# Patient Record
Sex: MALE | Race: WHITE | NOT HISPANIC OR LATINO | URBAN - METROPOLITAN AREA
[De-identification: names, ages, dates, MRNs, and addresses within clinical notes are randomized per-mention and may not be internally consistent; named-entity substitution may affect disease eponyms.]

---

## 2017-01-04 ENCOUNTER — MYC MEDICAL ADVICE (OUTPATIENT)
Dept: AUDIOLOGY | Facility: CLINIC | Age: 62
End: 2017-01-04

## 2017-01-12 DIAGNOSIS — H81.09 ACTIVE MENIERE'S DISEASE, UNSPECIFIED LATERALITY: Primary | ICD-10-CM

## 2017-01-12 RX ORDER — DIAZEPAM 5 MG
TABLET ORAL
Qty: 30 TABLET | Refills: 1 | COMMUNITY
Start: 2017-01-12 | End: 2017-12-20

## 2017-02-03 DIAGNOSIS — H81.09 MENIERE'S DISEASE: Primary | ICD-10-CM

## 2017-02-03 RX ORDER — SPIRONOLACTONE 50 MG/1
50 TABLET, FILM COATED ORAL DAILY
Qty: 30 TABLET | Refills: 11 | Status: SHIPPED | OUTPATIENT
Start: 2017-02-03 | End: 2017-02-08

## 2017-02-08 DIAGNOSIS — H81.09 MENIERE'S DISEASE: Primary | ICD-10-CM

## 2017-02-08 RX ORDER — SPIRONOLACTONE 50 MG/1
50 TABLET, FILM COATED ORAL DAILY
Qty: 90 TABLET | Refills: 3 | Status: SHIPPED | OUTPATIENT
Start: 2017-02-08 | End: 2017-05-17

## 2017-02-23 ENCOUNTER — MYC MEDICAL ADVICE (OUTPATIENT)
Dept: AUDIOLOGY | Facility: CLINIC | Age: 62
End: 2017-02-23

## 2017-03-08 ENCOUNTER — MYC MEDICAL ADVICE (OUTPATIENT)
Dept: AUDIOLOGY | Facility: CLINIC | Age: 62
End: 2017-03-08

## 2017-03-22 ENCOUNTER — OFFICE VISIT (OUTPATIENT)
Dept: AUDIOLOGY | Facility: CLINIC | Age: 62
End: 2017-03-22

## 2017-03-22 DIAGNOSIS — H90.3 SENSORY HEARING LOSS, BILATERAL: Primary | ICD-10-CM

## 2017-03-22 NOTE — PROGRESS NOTES
"AUDIOLOGY REPORT    BACKGROUND: Dr. Demarcus Tavarez implanted J Carlos Swan with a left  Nucleus  cochlear implant (CI) serial #[id number] on 5/28/15 (activation 6/17/15) and a right Nucleus  cochlear implant serial #[id number] on 8/15/16 (activation 9/13/16) due to a left profound sensorineural hearing loss and a right severe sensorineural hearing loss.  The right ear fluctuates due to Meniere's Disease.      Patient is being seen in Audiology at the [facility] on 3/22/17 for 6 month testing with the right CI, a hearing evaluation, tympanograms and reprogramming of his bilateral cochlear implants.          PATIENT REPORT: Mr. Swan reports that he continues to adjust to the right CI.  Both CIs together sound better than either one alone.  The right sounds \"brighter\" than the left which has more bass.      He continues to follow up with Dr. Tavarez for medical management of his Meniere's.  Patient reports that he hasn't had any dizziness episodes for about 1 month.      The patient wears the right CI with volume at 3-4 and left at about 1.  He typically wears Cafe, though we reviewed today that he may hear better from directions other than directly in front if using Home or Scan.      Patient reports he is no longer noticing interference from equipment at work.      METHOD: Speech perception testing is conducted at regular intervals to determine the degree of benefit the patient is obtaining from the cochlear implant. Tests are conducted using the cochlear implant without the benefit of lipreading. All tests are conducted in a sound-treated room. Perception of monosyllabic words and words in sentences are tested. Results usually show improvement over time. A decrease in performance indicates the need for re-programming of the prosthesis and/or replacement of components.     INTERVAL: 6 months with right CI.  Has had left for 1 1/2 years+ (not tested today " since last assessment was in June 2016)    TEST RESULTS:    Unaided Thresholds:   Right stable severe rising to moderately severe sloping back to severe hearing loss.  Loss is known to be sensorineural in nature so bone conduction was not repeated today.    Left did not test today - no response (profound sensorineural hearing loss) at June 2015 assessment postoperatively.      Tympanograms: Normal bilaterally    35 minutes were spent assessing the patient s auditory rehabilitation status.    Device(s) used for Testing:   Right ear: N6 processor with #1 magnet (no irritation), Program 1 - Home, Volume 1  Left ear: N6 processor with #1 magnet (no irritation) - Did not test this device today  Microphone covers changed before programming and testing.     Soundfield Aided Thresholds:   Left CI - Did not test   Right CI - Detection in normal to mild loss range    CNC Words Test:  The patient repeats 25 single syllable words, auditory only. The words are presented at 60 dB A (conversational level) delivered from a CD player.    Preoperative Performance (Feb 2015 before 1st CI):  Left ear aided: 0%  Right ear aided: 76%  Bilaterally aided: 68%    Preoperative Performance (June 2016 before 2nd CI):  Right ear aided: 12%    Left CI:  3 months: 88%   6 months: 76%   9 months: 84%   1 years: 84%  Did not test today     Right CI:  3 months: 68%  6 months (today): 84%    AzBio Sentences Test:  The patient repeats 20 sentences, auditory only.  The sentences are presented at 60 dB A (conversational level) delivered from a CD player.     Preoperative Performance (Feb 2015 before 1st CI:  Left ear aided: 0%  Right ear aided: 97% quiet, 91% +10 dB signal to noise ratio (SNR)  Bilaterally aided: Did not test in quiet, 94% +10 dB SNR      Preoperative Performance (June 2016 before 2nd CI):  Right ear aided: 40%    Left CI:  3 months: 97% quiet, 76% with +10 dB signal to noise ratio (SNR), 47% with +5 dB SNR  6 months: 94% quiet, 86%  "with +10 dB SNR, 50% with +5 dB SNR  9 months: 96% quiet, 81% with +10 dB SNR, 64% with +5 dB SNR  1 years: 97% quiet, 76% with +10 dB SNR, 51% with +5 dB SNR  Did not test today     Right CI:  3 months: 95% in quiet, 53% with +10 dB SNR  6 months (today): 94% in quiet, 74% with +10 dB SNR    After testing, programming changes were made since the patient would like a mute program added on the right and would like the left adjusted to make the sound quality \"brighter\" and start up volume softer.      FITTING SESSION: Dr. Demarcus Tavarez, cochlear implant surgeon, ordered today's appointment. The patient came to the clinic for adjustment to the programs in the external speech processor and for assessment of the external components of the cochlear implant system. These components provide power and data to the internal device. Sound is only heard once the external portion is activated. Postoperative treatment, including device fitting and adjustment, audiologic assessments, and training are required at regular intervals. Testing can include electropsychophysical measures of threshold, comfort, and loudness balancing, which are completed to update the program.    Processor type: N6 bilaterally   Headpiece type: 900 Series  Magnet strength: #1 bilaterally - no irritation.     TEST RESULTS:   Unaided hearing: See above.  For the right ear, since the thresholds are around 70 dB or poorer (EAS fitting range limit) in the low frequencies and due to the hearing fluctuations due to the Meniere's, patient is no longer interested in trying the acoustic component.  He wishes to proceed with electrical stimulation only to give him more stable hearing.  We will hold the EAS kit in clinic in case patient wishes to try this in the future.    Tympanograms: See above  Electrode Impedances: within normal limits and stable bilaterally   Neural Response Testing: Clear responses were found across the array at previous visit so did not repeat " testing today.    Facial Stimulation: Absent  Tinnitus: Present  Balance Problems: Absent  Pain/Discomfort: Absent  Strategies Tried:  Hz    Programs in N6 processors bilaterally:  1. (Left 46, Right 41) Home: ADRO + Autosensitivity  2. (Left 32, Right 47) Stimulation levels at 0 so patient can mute processor to practice with right side alone (at patient request)  3. (Left 46, Right 41) SCAN  4. (Left 46, Right 41) Cafe: Fixed directional, ADRO + Autosensitivity    Number of Channels per Program: 22    COMMENTS: On the left side, the start up volume was decreased 3 units.  Gains were sloped up in the highs.  Patient was pleased with these changes.  On the right side, a mute program was created.  Backup processors were updated bilaterally.      SUMMARY AND RECOMMENDATIONS: Mr. Swan has been using his right Cochlear [employer]  cochlear implant for 6 months.  Speech perception scores are above average, improved from 3 month testing and are similar to past left ear testing.  Patient will return in 6 months for testing in the right, left and bilateral conditions.    Programming changes were made to reduce left volume, increase high frequency gains on the left and to add a mute program on the right.      Patient has no residual hearing in the left ear and a stable severe rising to moderately severe sloping back to severe sensorineural hearing loss in the right ear.  Tympanograms are normal bilaterally.  Patient will continue to follow up with Dr. Tavarez regarding his Meniere's and for any other concerns related to his ears or cochlear implants.  Dr. Tavarez was updated on today's findings.        The patient expressed understanding and agreement with this plan.      Esetban Fernandez, CCC-A  Licensed Audiologist  [place] #[id number]    Enclosure: audiogram      Cc Demarcus Tavarez MD

## 2017-03-22 NOTE — MR AVS SNAPSHOT
After Visit Summary   3/22/2017    J Carlos Swan    MRN: [id number]           Patient Information     Date Of Birth          1955        Visit Information        Provider Department      3/22/2017 8:00 AM Jennifer Bingham AuD [facility] Audiology        Today's Diagnoses     Sensory hearing loss, bilateral    -  1       Follow-ups after your visit        Follow-up notes from your care team     Return in about 6 months (around 9/22/2017) for .      Your next 10 appointments already scheduled     Sep 18, 2017  8:00 AM CDT   Cochlear Implant Return with Naresh Britton [facility] Audiology ([facility] [place])    [address]  [address] [place] [address] [phone]              Who to contact     Please call your clinic at [phone] to:    Ask questions about your health    Make or cancel appointments    Discuss your medicines    Learn about your test results    Speak to your doctor   If you have compliments or concerns about an experience at your clinic, or if you wish to file a complaint, please contact [facility] Patient Relations at [phone] or email us at [email or web address]         Additional Information About Your Visit        MyChart Information     [email or web address] gives you secure access to your electronic health record. If you see a primary care provider, you can also send messages to your care team and make appointments. If you have questions, please call your primary care clinic.  If you do not have a primary care provider, please call [phone] and they will assist you.      "[employer]" is an electronic gateway that provides easy, online access to your medical records. With "[employer]", you can request a clinic appointment, read your test results, renew a prescription or communicate with your care team.     To access your existing account, please contact your [facility] or call  [phone] for assistance.        Care EveryWhere ID     This is your Care EveryWhere ID. This could be used by other organizations to access your [place] medical records  [id number]         Blood Pressure from Last 3 Encounters:   08/15/16 124/86   05/28/15 130/79    Weight from Last 3 Encounters:   08/15/16 72.6 kg (160 lb)   05/28/15 74.7 kg (164 lb 10.9 oz)              Today, you had the following     No orders found for display       Primary Care Provider Office Phone # Fax #    Guera SEAN Galvez [phone]       [facility]  [address] [address]        Thank you!     Thank you for choosing [facility] AUDIOLOGY  for your care. Our goal is always to provide you with excellent care. Hearing back from our patients is one way we can continue to improve our services. Please take a few minutes to complete the written survey that you may receive in the mail after your visit with us. Thank you!             Your Updated Medication List - Protect others around you: Learn how to safely use, store and throw away your medicines at www.disposemymeds.org.          This list is accurate as of: 3/22/17  8:46 AM.  Always use your most recent med list.                   Brand Name Dispense Instructions for use    ASPIRIN EC PO      Take 81 mg by mouth       B COMPLEX VITAMINS IJ          calcium carb 1250 mg (500 mg [place])/vitamin D 200 units 500-200 MG-UNIT per tablet    OSCAL with D     Take 1 tablet by mouth 2 times daily (with meals)       CENTRUM SILVER ULTRA MENS Tabs          glucosamine-chondroitin 500-400 MG Caps per capsule      Take 1 capsule by mouth daily       L-Proline 500 MG Caps          LEVOTHROID PO      Take 25 mcg by mouth       LIPITOR PO      Take 25 mg by mouth       LYSINE PO      Take 1 tablet by mouth daily       OMEGA-3 FISH OIL PO      Take 1 g by mouth daily       PROBIOTIC DAILY Caps          spironolactone 50 MG tablet    ALDACTONE    90 tablet     Take 1 tablet (50 mg) by mouth daily       * VALIUM PO      Take 5 mg by mouth every 12 hours as needed for anxiety       * diazepam 5 MG tablet    VALIUM    30 tablet    Take one tablet every 4-6 hours for symptoms of dizziness       Vitamin D-3 1000 UNITS Caps          Zinc 30 MG Caps          * Notice:  This list has 2 medication(s) that are the same as other medications prescribed for you. Read the directions carefully, and ask your doctor or other care provider to review them with you.

## 2017-03-27 ENCOUNTER — MYC MEDICAL ADVICE (OUTPATIENT)
Dept: AUDIOLOGY | Facility: CLINIC | Age: 62
End: 2017-03-27

## 2017-05-17 DIAGNOSIS — H81.09 MENIERE'S DISEASE: ICD-10-CM

## 2017-05-17 RX ORDER — SPIRONOLACTONE 50 MG/1
50 TABLET, FILM COATED ORAL DAILY
Qty: 90 TABLET | Refills: 3 | Status: SHIPPED | OUTPATIENT
Start: 2017-05-17

## 2017-07-31 ENCOUNTER — MYC MEDICAL ADVICE (OUTPATIENT)
Dept: AUDIOLOGY | Facility: CLINIC | Age: 62
End: 2017-07-31

## 2017-08-01 NOTE — TELEPHONE ENCOUNTER
From: J Carlos Swan  To: Jennifer Bingham AuD  Sent: 7/31/2017 6:02 PM CDT  Subject: Question about an upcoming visit    Hello Dr. Bingham,    I see that the Nucleus 7 was recently announced. I see that it finally has iPhone integration, which is terrific. Do you know anything about potential improvements in processing technology? The press release doesn't say anything about this.    The web site says that upgrades are available in October for current recipients that have internal implants Nucleus CI24RE,  and Profile Series Implants.    I checked online and determined that I have the  in my left ear and the  in my right ear. Will these internal implants be supported eventually for upgrades as well?     If so, when would it make sense to consider replacing my N6 processors with N7s? I would really like to have the direct to iPhone capability. Unless you're wearing your phone clip, which is bulky and has a clip that tends to break, phoning is very difficult.    Thanks,    J Carlos Swan

## 2017-09-13 DIAGNOSIS — H90.3 SENSORINEURAL HEARING LOSS, BILATERAL: Primary | ICD-10-CM

## 2017-09-18 ENCOUNTER — OFFICE VISIT (OUTPATIENT)
Dept: AUDIOLOGY | Facility: CLINIC | Age: 62
End: 2017-09-18

## 2017-09-18 DIAGNOSIS — H90.3 SENSORINEURAL HEARING LOSS, BILATERAL: ICD-10-CM

## 2017-09-18 NOTE — PROGRESS NOTES
"AUDIOLOGY REPORT    BACKGROUND: Dr. Demarcus Tavarez implanted J Carlos Swan with a left  Nucleus  cochlear implant (CI) serial #[id number] on 5/28/15 (activation 6/17/15) and a right Nucleus  cochlear implant serial #[id number] on 8/15/16 (activation 9/13/16) due to a left profound sensorineural hearing loss and a right severe sensorineural hearing loss.  The right ear fluctuates due to Meniere's Disease.      Patient is being seen in Audiology at the [facility] on 9/18/17 for 1 year testing with the right CI, 2 year testing with the left CI, a hearing evaluation, and reprogramming of his left cochlear implant.  He has declined doing tympanograms today due to cost.          PATIENT REPORT: Mr. Swan reports he turns the left device down to volume of 1 and leaves the right one at about the middle setting (between 5-7).  His left CI gives a \"broader range of pitches\" while the left seems lower in pitch to him.      He continues to follow up with Dr. Tavarez for medical management of his Meniere's.      NOTE: Patient reports he only has 1 hour to 1 hour 1 minutes available to complete today's appointment, which was scheduled for 2 hours.  Therefore, speech perception testing was abbreviated today.      METHOD: Speech perception testing is conducted at regular intervals to determine the degree of benefit the patient is obtaining from the cochlear implant. Tests are conducted using the cochlear implant without the benefit of lipreading. All tests are conducted in a sound-treated room. Perception of monosyllabic words and words in sentences are tested. Results usually show improvement over time. A decrease in performance indicates the need for re-programming of the prosthesis and/or replacement of components.     INTERVAL: 1 year with right CI; 2 years with left CI.      TEST RESULTS:    Unaided Thresholds:   Right essentially stable severe rising to moderately " severe sloping back to severe hearing loss (10 dB improvement at 1000 Hz and 10 dB decrease at 6000 Hz).  Loss is known to be sensorineural in nature so bone conduction was not repeated today.    Left did not test today - no response (profound sensorineural hearing loss) at June 2015 assessment postoperatively.      Tympanograms: Patient declined testing due to cost.  He denies otologic symptoms.    35 minutes were spent assessing the patient s auditory rehabilitation status.    Device(s) used for Testing:   Right ear: N6 processor with #1 magnet (no irritation), Program 1 - Home, Volume 7  Left ear: N6 processor with #1 magnet (no irritation) - Program 1 - Home, Volume 1  Microphone covers changed before programming and testing.     Soundfield Aided Thresholds:   Left CI - Detection in normal to mild loss range  Right CI - Detection in normal to mild loss range  Stable bilaterally    CNC Words Test:  The patient repeats 25 single syllable words, auditory only. The words are presented at 60 dB A (conversational level) delivered from a CD player.    Preoperative Performance (Feb 2015 before 1st CI):  Left ear aided: 0%  Right ear aided: 76%  Bilaterally aided: 68%    Preoperative Performance (June 2016 before 2nd CI):  Right ear aided: 12%    Left CI:  3 months: 88%   6 months: 76%   9 months: 84%   1 years: 84%  2 years (today): 88%    Right CI:  3 months: 68%  6 months: 84%  1 year (today): 68%     Bilateral CIs:  1 year (today): Did not test (DNT) due to high scores in unilateral conditions and patient's time limitations.    AzBio Sentences Test:  The patient repeats 20 sentences, auditory only.  The sentences are presented at 60 dB A (conversational level) delivered from a CD player.     Preoperative Performance (Feb 2015 before 1st CI:  Left ear aided: 0%  Right ear aided: 97% quiet, 91% +10 dB signal to noise ratio (SNR)  Bilaterally aided: Did not test in quiet, 94% +10 dB SNR      Preoperative Performance  (June 2016 before 2nd CI):  Right ear aided: 40%    Left CI:  3 months: 97% quiet, 76% with +10 dB signal to noise ratio (SNR), 47% with +5 dB SNR  6 months: 94% quiet, 86% with +10 dB SNR, 50% with +5 dB SNR  9 months: 96% quiet, 81% with +10 dB SNR, 64% with +5 dB SNR  1 years: 97% quiet, 76% with +10 dB SNR, 51% with +5 dB SNR  2 years (today): 94% quiet, 61% with +10 dB SNR, DNT +5 dB SNR due to time limitations    Right CI:  3 months: 95% in quiet, 53% with +10 dB SNR  6 months: 94% in quiet, 74% with +10 dB SNR  1 year (today): 98% quiet, 67% with +10 dB SNR, DNT +5 dB SNR due to time limitations    Bilateral CIs:  1 year (today): DNT due to time limitations    After testing, programming changes were made since the patient has been reducing the left volume and feels the quality is too low pitched.       FITTING SESSION: Dr. Demarcus Tavarez, cochlear implant surgeon, ordered today's appointment. The patient came to the clinic for adjustment to the programs in the external speech processor and for assessment of the external components of the cochlear implant system. These components provide power and data to the internal device. Sound is only heard once the external portion is activated. Postoperative treatment, including device fitting and adjustment, audiologic assessments, and training are required at regular intervals. Testing can include electropsychophysical measures of threshold, comfort, and loudness balancing, which are completed to update the program.    Processor type: N6 bilaterally   Headpiece type: 900 Series  Magnet strength: #1 bilaterally - no irritation.     TEST RESULTS:   Unaided hearing: See above.  For the right ear, since the thresholds are around 70 dB or poorer (EAS fitting range limit) in the low frequencies and due to the hearing fluctuations due to the Meniere's, patient is no longer interested in trying the acoustic component.  He wishes to proceed with electrical stimulation only to  give him more stable hearing.  We will hold the EAS kit in clinic in case patient wishes to try this in the future.    Tympanograms: See above - patient declined testing  Electrode Impedances: within normal limits and fairly stable for left ear.  Did not test right ear today.    Neural Response Testing: Clear responses were found across the arrays at previous visits so did not repeat testing today.    Facial Stimulation: Absent  Tinnitus: Present  Balance Problems: Absent  Pain/Discomfort: Absent  Strategies Tried:  Hz    Programs in N6 processors bilaterally:  1. (Left 48, Right 41) Home: ADRO + Autosensitivity  2. (Left 32, Right 47) Stimulation levels at 0 so patient can mute processor to practice with right side alone (at patient request)  3. (Left 48, Right 41) SCAN  4. (Left 48, Right 41) Cafe: Fixed directional, ADRO + Autosensitivity    Number of Channels per Program: 22    COMMENTS: The right ear was not changed today.  On the left side, threshold levels decreased in the low frequencies.  Comfort (C) levels increased across the array.  Upon going live, C levels were globally decreased 5 units to achieve comfort.  Gains were sloped up in the highs.  Patient was pleased with these changes.  Backup processor was updated bilaterally.      SUMMARY AND RECOMMENDATIONS: Mr. Swan has been using his right Cochlear [employer]  cochlear implant for 1 year and his left Cochlear [employer]  for 2 years.  Speech perception scores are above average and stable except for decrease in left CI noise scores.  Programming of the left CI was completed today due to this and patient's reports on volume and sound quality.  Patient will return for repeat testing in 1 year.  He will return sooner as needed for programming.      Patient has no residual hearing in the left ear and a stable severe rising to moderately severe sloping back to severe sensorineural hearing loss in the right ear.  Tympanograms were not  completed today at patient request due to cost.  Patient will continue to follow up with Dr. Tavarez regarding his Meniere's and for any other concerns related to his ears or cochlear implants.  The patient expressed understanding and agreement with this plan.      Esteban Fernandez, CCC-A  Licensed Audiologist  [place] #[id number]    Enclosure: audiogram

## 2017-09-18 NOTE — MR AVS SNAPSHOT
After Visit Summary   9/18/2017    J Carlos Swan    MRN: [id number]           Patient Information     Date Of Birth          1955        Visit Information        Provider Department      9/18/2017 8:00 AM Jennifer Bingham AuD M [facility] Audiology        Today's Diagnoses     Sensorineural hearing loss, bilateral           Follow-ups after your visit        Follow-up notes from your care team     Return in about 1 year (around 9/18/2018) for .      Who to contact     Please call your clinic at [phone] to:    Ask questions about your health    Make or cancel appointments    Discuss your medicines    Learn about your test results    Speak to your doctor   If you have compliments or concerns about an experience at your clinic, or if you wish to file a complaint, please contact [facility] Patient Relations at [phone] or email us at [email or web address]         Additional Information About Your Visit        MyChart Information     [employer] gives you secure access to your electronic health record. If you see a primary care provider, you can also send messages to your care team and make appointments. If you have questions, please call your primary care clinic.  If you do not have a primary care provider, please call [phone] and they will assist you.      [email or web address] is an electronic gateway that provides easy, online access to your medical records. With [email or web address], you can request a clinic appointment, read your test results, renew a prescription or communicate with your care team.     To access your existing account, please contact your [facility] or call [phone] for assistance.        Care EveryWhere ID     This is your Care EveryWhere ID. This could be used by other organizations to access your [place] medical records  [id number]         Blood Pressure from Last 3 Encounters:   08/15/16 124/86   05/28/15 130/79     Weight from Last 3 Encounters:   08/15/16 72.6 kg (160 lb)   05/28/15 74.7 kg (164 lb 10.9 oz)              We Performed the Following     AUDIOGRAM/TYMPANOGRAM - INTERFACE     AUDIOLOGY ADULT REFERRAL     Audiometry/Air   (99910)     Eval of Aud Rehab Status (60 min)   (65664)     LT: Diagnostic Analysis of CI 7 yrs & over, Subsequent Programming   (14885)        Primary Care Provider Office Phone # Fax #    Guera Galvez [phone]       [facility]  [address] [address]        Equal Access to Services     [facility] AH: Hadii aad ku hadasho Sotiffanyali, waaxda luqadaha, qaybta kaalmada adelesleyyada, radha greenberg. So [facility] [phone].    ATENCIÓN: Si habla español, tiene a watkins disposición servicios gratuitos de asistencia lingüística. [facility] [phone].    We comply with applicable federal civil rights laws and [place] laws. We do not discriminate on the basis of race, color, national origin, age, disability sex, sexual orientation or gender identity.            Thank you!     Thank you for choosing [facility] AUDIOLOGY  for your care. Our goal is always to provide you with excellent care. Hearing back from our patients is one way we can continue to improve our services. Please take a few minutes to complete the written survey that you may receive in the mail after your visit with us. Thank you!             Your Updated Medication List - Protect others around you: Learn how to safely use, store and throw away your medicines at www.disposemymeds.org.          This list is accurate as of: 9/18/17 11:47 AM.  Always use your most recent med list.                   Brand Name Dispense Instructions for use Diagnosis    ASPIRIN EC PO      Take 81 mg by mouth        B COMPLEX VITAMINS IJ           calcium carb 1250 mg (500 mg Qagan Tayagungin)/vitamin D 200 units 500-200 MG-UNIT per tablet    OSCAL with D     Take 1 tablet by mouth 2 times daily (with  meals)        CENTRUM SILVER ULTRA MENS Tabs           glucosamine-chondroitin 500-400 MG Caps per capsule      Take 1 capsule by mouth daily        L-Proline 500 MG Caps           LEVOTHROID PO      Take 25 mcg by mouth        LIPITOR PO      Take 25 mg by mouth        LYSINE PO      Take 1 tablet by mouth daily        OMEGA-3 FISH OIL PO      Take 1 g by mouth daily        PROBIOTIC DAILY Caps           spironolactone 50 MG tablet    ALDACTONE    90 tablet    Take 1 tablet (50 mg) by mouth daily    Meniere's disease       * VALIUM PO      Take 5 mg by mouth every 12 hours as needed for anxiety        * diazepam 5 MG tablet    VALIUM    30 tablet    Take one tablet every 4-6 hours for symptoms of dizziness    Active Meniere's disease, unspecified laterality       Vitamin D-3 1000 UNITS Caps           Zinc 30 MG Caps           * Notice:  This list has 2 medication(s) that are the same as other medications prescribed for you. Read the directions carefully, and ask your doctor or other care provider to review them with you.

## 2017-12-20 DIAGNOSIS — H81.09 ACTIVE MENIERE'S DISEASE, UNSPECIFIED LATERALITY: ICD-10-CM

## 2017-12-20 RX ORDER — DIAZEPAM 5 MG
TABLET ORAL
Qty: 30 TABLET | Refills: 5
Start: 2017-12-20

## 2018-01-11 ENCOUNTER — MYC MEDICAL ADVICE (OUTPATIENT)
Dept: AUDIOLOGY | Facility: CLINIC | Age: 63
End: 2018-01-11

## 2018-01-12 ENCOUNTER — MYC MEDICAL ADVICE (OUTPATIENT)
Dept: AUDIOLOGY | Facility: CLINIC | Age: 63
End: 2018-01-12

## 2018-04-30 DIAGNOSIS — H81.09 MENIERE'S DISEASE: Primary | ICD-10-CM

## 2018-04-30 RX ORDER — SPIRONOLACTONE 50 MG/1
50 TABLET, FILM COATED ORAL DAILY
Qty: 90 TABLET | Refills: 3 | Status: SHIPPED | OUTPATIENT
Start: 2018-04-30

## 2018-08-28 DIAGNOSIS — Z96.21 COCHLEAR IMPLANT STATUS: Primary | ICD-10-CM

## 2018-09-19 ENCOUNTER — OFFICE VISIT (OUTPATIENT)
Dept: AUDIOLOGY | Facility: CLINIC | Age: 63
End: 2018-09-19
Payer: COMMERCIAL

## 2018-09-19 DIAGNOSIS — H90.3 SENSORY HEARING LOSS, BILATERAL: Primary | ICD-10-CM

## 2018-09-19 DIAGNOSIS — Z96.21 COCHLEAR IMPLANT STATUS: ICD-10-CM

## 2018-09-19 NOTE — PROGRESS NOTES
AUDIOLOGY REPORT    BACKGROUND: Dr. Demarcus Tavarez implanted J Carlos Swan with a left  Nucleus  cochlear implant (CI) serial #[id number] on 5/28/15 (activation 6/17/15) and a right Nucleus  cochlear implant serial #[id number] on 8/15/16 (activation 9/13/16) due to a left profound sensorineural hearing loss and a right severe sensorineural hearing loss.  The right ear fluctuates due to Meniere's Disease.      Patient is being seen in Audiology at the [facility] [place] on 9/19/18 for 2 year testing with the right CI, 3 year testing with the left CI, a hearing evaluation, and reprogramming of his bilateral cochlear implants.  He has declined doing tympanograms today due to cost.          PATIENT REPORT: Mr. Swan reports that he turns both cochlear implants down to a volume of 1.  He needs a missing earhook replaced on one of his processors.  This was accomplished today.      He continues to follow up with Dr. Tavarez for medical management of his Meniere's.  Patient reports he has given up caffeine and alcohol and has had no recent dizziness episodes.     METHOD: Speech perception testing is conducted at regular intervals to determine the degree of benefit the patient is obtaining from the cochlear implant. Tests are conducted using the cochlear implant without the benefit of lipreading. All tests are conducted in a sound-treated room. Perception of monosyllabic words and words in sentences are tested. Results usually show improvement over time. A decrease in performance indicates the need for re-programming of the prosthesis and/or replacement of components.     INTERVAL: 2 years with right CI; 3 years with left CI.      TEST RESULTS:    Unaided Thresholds:   Right severe rising to moderately severe sloping back to severe sensorineural hearing loss hearing loss (re: 9/18/17 decrease of 10-15 dB from 250-1000 Hz and 3000 Hz; improvement of 10-15 dB 0286-0802  Hz).      Left did not test today - no response (profound sensorineural hearing loss) at June 2015 assessment postoperatively.      Tympanograms: Patient declined testing due to cost.  He denies otologic symptoms.    45 minutes were spent assessing the patient s auditory rehabilitation status.    Device(s) used for Testing:   Right ear: N6 processor with #2 magnet - Reduced to #1 from backup coil today and another will be ordered and mailed to his home (slight irritation of skin with #2 and retention felt stronger than necessary; patient is unsure how it is a #2 as he was wearing a #1 at last year's visit), Program 3 SCAN, Volume 1  Left ear: N6 processor with #1 magnet (no irritation) - Program 3 SCAN, Volume 1  Microphone covers changed before programming and testing.     Soundfield Aided Thresholds:   Left CI - Detection in normal to mild loss range  Right CI - Detection in borderline normal to mild loss range  Stable bilaterally    CNC Words Test:  The patient repeats 25 single syllable words, auditory only. The words are presented at 60 dB A (conversational level) delivered from a CD player.    Preoperative Performance (Feb 2015 before 1st CI):  Left ear aided: 0%  Right ear aided: 76%  Bilaterally aided: 68%    Preoperative Performance (June 2016 before 2nd CI):  Right ear aided: 12%    Left CI:  3 months: 88%   6 months: 76%   9 months: 84%   1 years: 84%  2 years: 88%  3 years (today): 84%    Right CI:  3 months: 68%  6 months: 84%  1 year: 68%   2 years (today): 76%     Bilateral CIs:  1 year: Did not test (DNT) due to high scores in unilateral conditions and patient's time limitations.  2 years (today): DNT due to high scores in unilateral conditions    [employer] Sentences Test:  The patient repeats 20 sentences, auditory only.  The sentences are presented at 60 dB A (conversational level) delivered from a CD player.     Preoperative Performance (Feb 2015 before 1st CI:  Left ear aided: 0%  Right ear aided:  97% quiet, 91% +10 dB signal to noise ratio (SNR)  Bilaterally aided: Did not test in quiet, 94% +10 dB SNR      Preoperative Performance (June 2016 before 2nd CI):  Right ear aided: 40%    Left CI:  3 months: 97% quiet, 76% with +10 dB signal to noise ratio (SNR)  6 months: 94% quiet, 86% with +10 dB SNR  9 months: 96% quiet, 81% with +10 dB SNR  1 years: 97% quiet, 76% with +10 dB SNR  2 years: 94% quiet, 61% with +10 dB SNR  3 years (today): 91% quiet, 61% with +10 dB SNR     Right CI:  3 months: 95% in quiet, 53% with +10 dB SNR  6 months: 94% in quiet, 74% with +10 dB SNR  1 year: 98% quiet, 67% with +10 dB SNR  2 years (today): 96% quiet, 77% with +10 dB SNR    Bilateral CIs:  1 year: DNT due to time limitations  2 years (today): 75% with +10 dB SNR, 38% with +5 dB SNR    After testing, programming changes were made since the patient has been reducing the volume to 1 bilaterally.      FITTING SESSION: Dr. Demarcus Tavarez, cochlear implant surgeon, ordered today's appointment. The patient came to the clinic for adjustment to the programs in the external speech processor and for assessment of the external components of the cochlear implant system. These components provide power and data to the internal device. Sound is only heard once the external portion is activated. Postoperative treatment, including device fitting and adjustment, audiologic assessments, and training are required at regular intervals. Testing can include electropsychophysical measures of threshold, comfort, and loudness balancing, which are completed to update the program.    Processor type: N6 bilaterally   Headpiece type: 900 Series  Magnet strength: #1 bilaterally     TEST RESULTS:   Unaided hearing: See above.    Tympanograms: See above - patient declined testing  Electrode Impedances: within normal limits and slight lower for left ear.  Within normal limits and stable for right ear.     Neural Response Testing: Clear responses were found  across the arrays at previous visits so did not repeat testing today.    Facial Stimulation: Absent  Tinnitus: Present  Balance Problems: Absent  Pain/Discomfort: Absent  Strategies Tried:  Hz    Programs in N6 processors bilaterally:  1. (Left 52, Right 51) Home: ADRO + Autosensitivity  2. (Left 32, Right 47) Stimulation levels at 0 so patient can mute processor to practice with right side alone (at patient request)  3. (Left 52, Right 51) SCAN  4. (Left 52, Right 51) Cafe: Fixed directional, ADRO + Autosensitivity    Number of Channels per Program: 22    COMMENTS: Since the patient has been decreasing the volume to 1 bilaterally, comfort (C) levels were globally reduced 5 units.  Speech was still slightly too loud so threshold and comfort levels were reduced an additional 2 units.  Volume range was changed from +/-20% to +/-30% to allow more patient adjustments.      SUMMARY AND RECOMMENDATIONS: Mr. Swan has been using his right [employer]  cochlear implant for 2 years and his left [employer]  for 3 years.  Speech perception scores are above average and stable.  Programming of both CIs was completed today to reduce start up volume and widen the volume range.  Patient will return for repeat testing in 1 year.  He will return sooner as needed for programming.      Patient has no residual hearing in the left ear.  Hearing in the right ear has decreased in the low frequencies after implantation, as expected.  Tympanograms were not completed today at patient request due to cost.  Patient will continue to follow up with Dr. Tavarez regarding his Meniere's and for any other concerns related to his ears or cochlear implants.  The patient expressed understanding and agreement with this plan.      Esteban Fernandez, CCC-A  Licensed Audiologist  [place] #[id number]    Enclosure: audiogram    Cc Demarcus Tavarez MD

## 2018-09-19 NOTE — MR AVS SNAPSHOT
After Visit Summary   9/19/2018    J Carlos Swan    MRN: [id number]           Patient Information     Date Of Birth          1955        Visit Information        Provider Department      9/19/2018 1:00 PM Jennifer Bingham AuD [facility] Audiology        Today's Diagnoses     Sensory hearing loss, bilateral    -  1    Cochlear implant status           Follow-ups after your visit        Who to contact     Please call your clinic at [phone] to:    Ask questions about your health    Make or cancel appointments    Discuss your medicines    Learn about your test results    Speak to your doctor            Additional Information About Your Visit        MyChart Information     [employer] gives you secure access to your electronic health record. If you see a primary care provider, you can also send messages to your care team and make appointments. If you have questions, please call your primary care clinic.  If you do not have a primary care provider, please call [phone] and they will assist you.      [employer] is an electronic gateway that provides easy, online access to your medical records. With [employer], you can request a clinic appointment, read your test results, renew a prescription or communicate with your care team.     To access your existing account, please contact your [facility] or call [phone] for assistance.        Care EveryWhere ID     This is your Care EveryWhere ID. This could be used by other organizations to access your [place] medical records  [id number]         Blood Pressure from Last 3 Encounters:   08/15/16 124/86   05/28/15 130/79    Weight from Last 3 Encounters:   08/15/16 72.6 kg (160 lb)   05/28/15 74.7 kg (164 lb 10.9 oz)              We Performed the Following     AUDIOGRAM/TYMPANOGRAM - INTERFACE     AUDIOLOGY ADULT REFERRAL     Audiometry/Air and Bone   (21113)     Eval of Aud Rehab Status (60 min)   (23455)     LT: Diagnostic Analysis  of CI 7 yrs & over, Subsequent Programming   (37610)     RT: Diagnostic Analysis of CI 7 yrs & over, Subsequent Programming   (46061)        Primary Care Provider Office Phone # Fax #    Guera Galvez [phone]       [facility]  [address] [address]        Equal Access to Services     ADDI SOLIS AH: Hadii aad ku hadasho Soomaali, waaxda luqadaha, qaybta kaalmada adeegyada, waxay idiin hayaan adeeg kharash la'aan ah. So [facility] [phone].    ATENCIÓN: Si habla espnany, tiene a watkins disposición servicios gratuitos de asistencia lingüística. Eliel al [phone].    We comply with applicable federal civil rights laws and [place] laws. We do not discriminate on the basis of race, color, national origin, age, disability, sex, sexual orientation, or gender identity.            Thank you!     Thank you for choosing [facility] AUDIOLOGY  for your care. Our goal is always to provide you with excellent care. Hearing back from our patients is one way we can continue to improve our services. Please take a few minutes to complete the written survey that you may receive in the mail after your visit with us. Thank you!             Your Updated Medication List - Protect others around you: Learn how to safely use, store and throw away your medicines at www.disposemymeds.org.          This list is accurate as of 9/19/18  2:56 PM.  Always use your most recent med list.                   Brand Name Dispense Instructions for use Diagnosis    ASPIRIN EC PO      Take 81 mg by mouth        B COMPLEX VITAMINS IJ           calcium carbonate 500 mg-vitamin D 200 units 500-200 MG-UNIT per tablet    OSCAL with D;OYSTER SHELL CALCIUM     Take 1 tablet by mouth 2 times daily (with meals)        CENTRUM SILVER ULTRA MENS Tabs           glucosamine-chondroitin 500-400 MG Caps per capsule      Take 1 capsule by mouth daily        L-Proline 500 MG Caps           LEVOTHROID PO      Take 25 mcg by  mouth        LIPITOR PO      Take 25 mg by mouth        LYSINE PO      Take 1 tablet by mouth daily        OMEGA-3 FISH OIL PO      Take 1 g by mouth daily        PROBIOTIC DAILY Caps           * spironolactone 50 MG tablet    ALDACTONE    90 tablet    Take 1 tablet (50 mg) by mouth daily    Meniere's disease       * spironolactone 50 MG tablet    ALDACTONE    90 tablet    Take 1 tablet (50 mg) by mouth daily    Meniere's disease       * VALIUM PO      Take 5 mg by mouth every 12 hours as needed for anxiety        * diazepam 5 MG tablet    VALIUM    30 tablet    Take one tablet every 4-6 hours for symptoms of dizziness    Active Meniere's disease, unspecified laterality       Vitamin D-3 1000 units Caps           Zinc 30 MG Caps           * Notice:  This list has 4 medication(s) that are the same as other medications prescribed for you. Read the directions carefully, and ask your doctor or other care provider to review them with you.

## 2018-09-24 ENCOUNTER — TELEPHONE (OUTPATIENT)
Dept: AUDIOLOGY | Facility: CLINIC | Age: 63
End: 2018-09-24

## 2018-09-24 NOTE — TELEPHONE ENCOUNTER
Anaya #1 magnet received from [employer].  Clinic will ship this to patient's home address today.      Esteban Fernandez, CCC-A  Licensed Audiologist  [place] #[id number]

## 2019-09-16 DIAGNOSIS — H90.3 SENSORINEURAL HEARING LOSS, BILATERAL: Primary | ICD-10-CM

## 2019-09-21 NOTE — PROGRESS NOTES
AUDIOLOGY REPORT    BACKGROUND: Dr. Demarcus Tavarez implanted J Carlos Swan with a left  Nucleus  cochlear implant (CI) serial #[id number] on 5/28/15 (activation 6/17/15) and a right Nucleus  cochlear implant serial #[id number] on 8/15/16 (activation 9/13/16) due to a left profound sensorineural hearing loss and a right severe sensorineural hearing loss.  The right ear fluctuates due to Meniere's Disease.      Patient is being seen in Audiology at the [facility] [place] on 9/23/19 for 3 year testing with the right CI, 4 year testing with the left CI, a hearing evaluation, and reprogramming of his bilateral cochlear implants.  He has declined doing tympanograms today due to cost.     Dr. Agustina Sevilla ordered today's visit.          PATIENT REPORT: Mr. Swan reports no current concerns with his hearing or cochlear implant equipment.  He reports he has primarily been wearing Cafe program with decreased volume since his last visit.  He discussed that since Cafe is directional, that it is recommended he use Home or SCAN most of the time.      He continues to follow up in ENT Clinic for medical management of his Meniere's.  Patient reports he has given up caffeine and alcohol and has had no recent dizziness episodes.  He feels using the sauna daily has also been helpful.  He has recently retired and is spending his time playing tennis swimming, auditing a math class and attending book club.      METHOD: Speech perception testing is conducted at regular intervals to determine the degree of benefit the patient is obtaining from the cochlear implant. Tests are conducted using the cochlear implant without the benefit of lipreading. All tests are conducted in a sound-treated room. Perception of monosyllabic words and words in sentences are tested. Results usually show improvement over time. A decrease in performance indicates the need for re-programming of the prosthesis  and/or replacement of components.     INTERVAL: 3 years with right CI; 4 years with left CI.      TEST RESULTS:    Otoscopy: Cerumen removed from right ear using lighted curette without incident prior to testing.  Left ear had small amount of non-occluding cerumen which was not removed.      Unaided Thresholds:   Right profound rising to moderately severe primarily sensorineural hearing loss hearing loss (re: 9/19/18 decreased 10-15 dB from 250-2000 Hz).      Left did not test today - no response (profound sensorineural hearing loss) at June 2015 assessment postoperatively.      Tympanograms: Patient declined testing due to cost.  He denies otologic symptoms.    45 minutes were spent assessing the patient s auditory rehabilitation status.    Device(s) used for Testing:   Right ear: N6 processor with #1 magnet (no irritation), Program 3 SCAN, Volume 3  Left ear: N6 processor with #1 magnet (no irritation) - Program 3 SCAN, Volume 3  Microphone covers changed before programming and testing.     Soundfield Aided Thresholds:   Left CI - Detection in normal to mild loss range  Right CI - Detection in normal to mild loss range  Stable bilaterally    CNC Words Test:  The patient repeats 25 single syllable words, auditory only. The words are presented at 60 dB A (conversational level) delivered from a CD player.    Preoperative Performance (Feb 2015 before 1st CI):  Left ear aided: 0%  Right ear aided: 76%  Bilaterally aided: 68%    Preoperative Performance (June 2016 before 2nd CI):  Right ear aided: 12%    Left CI:  3 months: 88%   6 months: 76%   9 months: 84%   1 years: 84%  2 years: 88%  3 years: 84%  4 years (today): 56%      Right CI:  3 months: 68%  6 months: 84%  1 year: 68%   2 years: 76%   3 years (today): 56%    Bilateral CIs:  1 year: Did not test (DNT) due to high scores in unilateral conditions and patient's time limitations.  2 years: DNT due to high scores in unilateral conditions  3 years (today): DNT due  to need for programming    AzBio Sentences Test:  The patient repeats 20 sentences, auditory only.  The sentences are presented at 60 dB A (conversational level) delivered from a CD player.     Preoperative Performance (Feb 2015 before 1st CI:  Left ear aided: 0%  Right ear aided: 97% quiet, 91% +10 dB signal to noise ratio (SNR)  Bilaterally aided: Did not test in quiet, 94% +10 dB SNR      Preoperative Performance (June 2016 before 2nd CI):  Right ear aided: 40%    Left CI:  3 months: 97% quiet, 76% with +10 dB signal to noise ratio (SNR)  6 months: 94% quiet, 86% with +10 dB SNR  9 months: 96% quiet, 81% with +10 dB SNR  1 years: 97% quiet, 76% with +10 dB SNR  2 years: 94% quiet, 61% with +10 dB SNR  3 years: 91% quiet, 61% with +10 dB SNR   4 years (today): 90% quiet, Did not test in noise due to need for programming     Right CI:  3 months: 95% in quiet, 53% with +10 dB SNR  6 months: 94% in quiet, 74% with +10 dB SNR  1 year: 98% quiet, 67% with +10 dB SNR  2 years: 96% quiet, 77% with +10 dB SNR  3 years (today): 94% quiet, Did not test in noise due to need for programming    Bilateral CIs:  1 year: DNT due to time limitations  2 years: 75% with +10 dB SNR, 38% with +5 dB SNR  3 years (today): 93% quiet, 72% with +10 dB SNR, Did not test +5 dB SNR due to need for programming     After testing, programming changes were made since CNC word scores decreased bilaterally.       FITTING SESSION: Dr. Agustina Sevilla ordered today's appointment. The patient came to the clinic for adjustment to the programs in the external speech processor and for assessment of the external components of the cochlear implant system. These components provide power and data to the internal device. Sound is only heard once the external portion is activated. Postoperative treatment, including device fitting and adjustment, audiologic assessments, and training are required at regular intervals. Testing can include electropsychophysical  measures of threshold, comfort, and loudness balancing, which are completed to update the program.    Processor type: N6 bilaterally   Headpiece type: 900 Series  Magnet strength: #1 bilaterally - no irritation or discomfort     TEST RESULTS:   Unaided hearing: See above.    Tympanograms: See above - patient declined testing  Electrode Impedances: within normal limits and stable bilaterally  Neural Response Testing: Clear responses were found across the arrays at previous visits so did not repeat testing today.    Facial Stimulation: Absent  Tinnitus: Present  Balance Problems: Absent  Pain/Discomfort: Absent  Strategies Tried:  Hz    Programs in N6 processors bilaterally:  1. (Left 56, Right 55) Home: ADRO + Autosensitivity  2. (Left 32, Right 47) Stimulation levels at 0 so patient can mute processor to practice with right side alone (at patient request)  3. (Left 56, Right 55) SCAN  4. (Left 56, Right 55) Cafe: Fixed directional, ADRO + Autosensitivity    Number of Channels per Program: 22    COMMENTS: Threshold (T) levels were fairly stable for the left CI and decreased in the high frequencies for the right CI.  In general, comfort (C) levels increased slightly bilaterally.  Upon going live, speech was too loud and T and C levels were globally reduced 5 units bilaterally to achieve comfort.  The backup processors were updated.  Patient uses the backup processors with [employer] when playing tennis.        SUMMARY AND RECOMMENDATIONS: Mr. Swan has been using his right [employer]  cochlear implant for 3 years and his left [employer]  for 4 years.  Speech perception scores are above average and stable for [employer] sentences.  CNC word scores have decreased.  It is unclear whether this was because he has started wearing Cafe program exclusively or a programming issue.  Patient was advised to try to wear Home or SCAN most of the time and to only switch to Cafe in noise.  Programming  changes were made.  Patient will return for repeat testing in 1 year.  He will return sooner as needed for programming.      Patient has no residual hearing in the left ear.  Hearing in the right ear has decreased in the low frequencies after implantation, as expected.  Tympanograms were not completed today at patient request due to cost.  Patient will continue to follow up in ENT Clinic regarding his Meniere's and for any other concerns related to his ears or cochlear implants.  The patient expressed understanding and agreement with this plan.      Esteban Fernandez, CCC-A  Licensed Audiologist  [place] #[id number]      Enclosure: audiogram

## 2019-09-23 ENCOUNTER — OFFICE VISIT (OUTPATIENT)
Dept: AUDIOLOGY | Facility: CLINIC | Age: 64
End: 2019-09-23
Attending: OTOLARYNGOLOGY
Payer: COMMERCIAL

## 2019-09-23 DIAGNOSIS — H90.3 SENSORY HEARING LOSS, BILATERAL: Primary | ICD-10-CM

## 2019-11-05 ENCOUNTER — HEALTH MAINTENANCE LETTER (OUTPATIENT)
Age: 64
End: 2019-11-05

## 2020-02-16 ENCOUNTER — HEALTH MAINTENANCE LETTER (OUTPATIENT)
Age: 65
End: 2020-02-16

## 2020-09-10 NOTE — PROGRESS NOTES
AUDIOLOGY REPORT    BACKGROUND: Dr. Demarcus Tavarez implanted J Carlos Swan with a left  Nucleus  cochlear implant (CI) serial #[id number] on 5/28/15 (activation 6/17/15) and a right Nucleus  cochlear implant serial #[id number] on 8/15/16 (activation 9/13/16) due to a left profound sensorineural hearing loss and a right severe sensorineural hearing loss.  The right ear fluctuates due to Meniere's Disease.      Patient is being seen in Audiology at the [facility] on 9/21/2020 for 4 year testing with the right CI, 5 year testing with the left CI, a hearing evaluation, and reprogramming of his bilateral cochlear implants.  He has declined doing tympanograms today due to cost.     Dr. Gail Ramirez ordered today's visit.          PATIENT REPORT: Mr. Swan reports no current concerns with his hearing or cochlear implant equipment.  He would like to wait to upgrade equipment until both ears are eligible and after a new processor later becomes available.      He reports he has primarily been wearing Cafe program with decreased volume since his last visit.  He discussed that since Cafe is directional, that it is recommended he use Home or SCAN most of the time.      One processor will not allow a battery to twist on.  The lock was engaged by mistake.  When this was unlocked, the battery went on normally.  Reviewed with patient.     He continues to follow up in ENT Clinic for medical management of his Meniere's.  Patient reports he has given up caffeine and alcohol and has had no recent dizziness episodes.  He feels using the sauna daily has also been helpful.  He retired last year and is spending his time playing tennis swimming and attending book club.      METHOD: Speech perception testing is conducted at regular intervals to determine the degree of benefit the patient is obtaining from the cochlear implant. Tests are conducted using the cochlear implant without  the benefit of lipreading. All tests are conducted in a sound-treated room. Perception of monosyllabic words and words in sentences are tested. Results usually show improvement over time. A decrease in performance indicates the need for re-programming of the prosthesis and/or replacement of components.     INTERVAL: 4 years with right CI; 5 years with left CI.      TEST RESULTS:    Otoscopy: Left ear had small amount of non-occluding cerumen.  Right ear clear of cerumen.      Unaided Thresholds:   Right profound rising to moderately severe loss - stable.  Known sensorineural so did not retest bone.        Left did not test today - no response (profound sensorineural hearing loss) at June 2015 assessment postoperatively.      Tympanograms: Patient declined testing due to cost.  He denies otologic symptoms.    45 minutes were spent assessing the patient s auditory rehabilitation status.    Device(s) used for Testing:   Right ear: N6 processor with #1 magnet (no irritation), Program 3 SCAN, Volume 3  Left ear: N6 processor with #1 magnet (no irritation) - Program 3 SCAN, Volume 3  Microphone covers changed before programming and testing.     Soundfield Aided Thresholds:   Left CI - Detection in borderline normal to mild loss range - stable  Right CI - Detection in normal to borderline normal loss range - stable      CNC Words Test:  The patient repeats 25 single syllable words, auditory only. The words are presented at 60 dB A (conversational level) delivered from a CD player.    Preoperative Performance (Feb 2015 before 1st CI):  Left ear aided: 0%  Right ear aided: 76%  Bilaterally aided: 68%    Preoperative Performance (June 2016 before 2nd CI):  Right ear aided: 12%    Left CI:  3 months: 88%   6 months: 76%   9 months: 84%   1 years: 84%  2 years: 88%  3 years: 84%  4 years: 56%  5 years (today): 72% - improved but slightly below baseline    Right CI:  3 months: 68%  6 months: 84%  1 year: 68%   2 years: 76%   3  years: 56%  4 years (today): 80% - improved    Bilateral CIs:  1 year: Did not test (DNT) due to high scores in unilateral conditions and patient's time limitations.  2 years: DNT due to high scores in unilateral conditions  3 years: DNT due to need for programming   4 years (today): 84%    Az[employer] Sentences Test:  The patient repeats 20 sentences, auditory only.  The sentences are presented at 60 dB A (conversational level) delivered from a CD player.     Preoperative Performance (Feb 2015 before 1st CI:  Left ear aided: 0%  Right ear aided: 97% quiet, 91% +10 dB signal to noise ratio (SNR)  Bilaterally aided: Did not test in quiet, 94% +10 dB SNR      Preoperative Performance (June 2016 before 2nd CI):  Right ear aided: 40%    Left CI:  3 months: 97% quiet, 76% with +10 dB signal to noise ratio (SNR)  6 months: 94% quiet, 86% with +10 dB SNR  9 months: 96% quiet, 81% with +10 dB SNR  1 years: 97% quiet, 76% with +10 dB SNR  2 years: 94% quiet, 61% with +10 dB SNR  3 years: 91% quiet, 61% with +10 dB SNR   4 years: 90% quiet, Did not test in noise due to need for programming   5 years (today): 84% quiet - slightly decreased, Did not test in noise due to need for programming     Right CI:  3 months: 95% in quiet, 53% with +10 dB SNR  6 months: 94% in quiet, 74% with +10 dB SNR  1 year: 98% quiet, 67% with +10 dB SNR  2 years: 96% quiet, 77% with +10 dB SNR  3 years: 94% quiet, Did not test in noise due to need for programming  4 years (today): 95% quiet - stable, Did not test in noise due to need for programming    Bilateral CIs:  1 year: DNT due to time limitations  2 years: 75% with +10 dB SNR, 38% with +5 dB SNR  3 years: 93% quiet, 72% with +10 dB SNR, Did not test +5 dB SNR due to need for programming   4 years (today): DNT quiet, 75% with +10 dB SNR - stable, Did not test +5 dB SNR due to need for programming     After testing, programming changes based on left CI score changes and patient report that he  decreases volume bilaterally.       FITTING SESSION: Dr. Gail Ramirez ordered today's appointment. The patient came to the clinic for adjustment to the programs in the external speech processor and for assessment of the external components of the cochlear implant system. These components provide power and data to the internal device. Sound is only heard once the external portion is activated. Postoperative treatment, including device fitting and adjustment, audiologic assessments, and training are required at regular intervals. Testing can include electropsychophysical measures of threshold, comfort, and loudness balancing, which are completed to update the program.    Processor type: N6 bilaterally   Headpiece type: 900 Series  Magnet strength: #1 bilaterally - no irritation or discomfort     TEST RESULTS:   Unaided hearing: See above.    Tympanograms: See above - patient declined testing  Datalogging: Right 12.3 hours of use per day, Left 5.3 hours of use per day - this is not accurate as patient switched from a backup since the battery lock was engaged on the primary  Electrode Impedances: within normal limits and stable bilaterally  Neural Response Testing: Clear responses were found across the arrays at previous visits so did not repeat testing today.    Facial Stimulation: Absent  Tinnitus: Present  Balance Problems: Absent  Pain/Discomfort: Absent  Strategies Tried:  Hz    Programs in N6 processors bilaterally:  1. (Left 61, Right 62) Home: ADRO + Autosensitivity  2. (Left 32, Right 47) Stimulation levels at 0 so patient can mute processor to practice with right side alone (at patient request)  3. (Left 61, Right 62) SCAN  4. (Left 61, Right 62) Cafe: Fixed directional, ADRO + Autosensitivity    Number of Channels per Program: 22    COMMENTS: For the left CI, threshold (T) levels were measured.  They increased slightly and were globally increased 5 units due to the wide dynamic range.  C levels were  fairly stable to slightly increased.  For the right CI, since scores were stable, only global C level adjustments were made. Upon going live in the bilateral condition, C levels were globally reduced to comfort in the left CI and T and C levels (due to narrower dynamic range) were globally reduced to comfort in right CI.  Volume control range was increased.  Patient reported improved comfort after the changes.  Backup processors were updated.     SUMMARY AND RECOMMENDATIONS: Mr. Swan has been using his right [employer]  cochlear implant for 4 years and his left [employer]  for 5 years.  Speech perception scores are above average.  CNC scores improved bilaterally compared to last year's testing when they declined but are not quite back to baseline for left CI.  Right [employer] was stable but left decreased slightly.  Based on this programming changes were made to the left CI and global volume decreases were also made in right CI at patient request. Patient was advised to try to wear Home or SCAN most of the time and to only switch to Cafe in noise.  Patient will return for repeat testing in 1 year.  He will return sooner as needed for programming.  He wishes to wait to upgrade equipment until both ears are eligible and when there is new technology available.     Patient has no residual hearing in the left ear.  Hearing in the right ear is stable.  Tympanograms were not completed today at patient request due to cost.  Patient will continue to follow up in ENT Clinic regarding his Meniere's and for any other concerns related to his ears or cochlear implants.  The patient expressed understanding and agreement with this plan.    Esteban Fernandez, CCC-A, [facility]  Licensed Audiologist  [place] #[id number]    Enclosure: audiogram

## 2020-09-16 DIAGNOSIS — H90.3 SENSORINEURAL HEARING LOSS, BILATERAL: Primary | ICD-10-CM

## 2020-09-21 ENCOUNTER — OFFICE VISIT (OUTPATIENT)
Dept: AUDIOLOGY | Facility: CLINIC | Age: 65
End: 2020-09-21
Payer: COMMERCIAL

## 2020-09-21 DIAGNOSIS — H90.3 SENSORINEURAL HEARING LOSS, BILATERAL: ICD-10-CM

## 2020-11-22 ENCOUNTER — HEALTH MAINTENANCE LETTER (OUTPATIENT)
Age: 65
End: 2020-11-22

## 2021-04-10 ENCOUNTER — HEALTH MAINTENANCE LETTER (OUTPATIENT)
Age: 66
End: 2021-04-10

## 2021-08-24 DIAGNOSIS — H90.3 SENSORINEURAL HEARING LOSS, BILATERAL: Primary | ICD-10-CM

## 2021-09-19 ENCOUNTER — HEALTH MAINTENANCE LETTER (OUTPATIENT)
Age: 66
End: 2021-09-19

## 2021-12-12 NOTE — PROGRESS NOTES
AUDIOLOGY REPORT    BACKGROUND: Dr. Demarcus Tavarez implanted J Carlos Swan with a left  Nucleus  cochlear implant (CI) serial #[id number] on 5/28/15 (activation 6/17/15) and a right Nucleus  cochlear implant serial #[id number] on 8/15/16 (activation 9/13/16) due to a left profound sensorineural hearing loss and a right severe sensorineural hearing loss.  The right ear fluctuates due to Meniere's Disease.      Patient is being seen in Audiology at the [facility] on 12/22/2021 for 5 year testing with the right CI, 6 year testing with the left CI, and reprogramming of his bilateral cochlear implants.      Dr. Gail Ramirez ordered today's visit.        PATIENT REPORT: Mr. Swan reports that his wife feels he hasn't been hearing as well over the last 6 months.  He generally reduces the volume and wears program 1.  He reports one of his backup processors was not working but when troubleshooting today in clinic, both were functioning.  He would like to wait to upgrade equipment until the next generation processor is available if possible.      Patient has not has any recent issues with dizziness.  About 6-8 months ago, he had a transient memory issue and work up in ER did not find a cause.     METHOD: Speech perception testing is conducted at regular intervals to determine the degree of benefit the patient is obtaining from the cochlear implant. Tests are conducted using the cochlear implant without the benefit of lipreading. All tests are conducted in a sound-treated room. Perception of monosyllabic words and words in sentences are tested. Results usually show improvement over time. A decrease in performance indicates the need for re-programming of the prosthesis and/or replacement of components.     INTERVAL: 5 years with right CI; 6 years with left CI.      TEST RESULTS:    Otoscopy: Both ears are clear of cerumen.      Unaided Thresholds: Did not test - Left  no response in past; Right did not test due to CI and cost   Tympanograms: Patient declined testing due to cost.  He denies otologic symptoms.    45 minutes were spent assessing the patient s auditory rehabilitation status.    Device(s) used for Testing:   Right ear: N6 processor with #1 magnet (no irritation), Program 1  Left ear: N6 processor with #1 magnet (no irritation) - Program 1  Microphone covers changed before programming and testing.     Soundfield Aided Thresholds:   Left CI - Detection in normal to mild loss range - stable  Right CI - Detection in normal to mild loss range - stable      CNC Words Test:  The patient repeats 25 single syllable words, auditory only. The words are presented at 60 dB A (conversational level) delivered from a CD player.    Preoperative Performance (Feb 2015 before 1st CI):  Left ear aided: 0%  Right ear aided: 76%  Bilaterally aided: 68%    Preoperative Performance (June 2016 before 2nd CI):  Right ear aided: 12%    Left CI:  3 months: 88%   6 months: 76%   9 months: 84%   1 years: 84%  2 years: 88%  3 years: 84%  4 years: 56%  5 years: 72% - improved but slightly below baseline  6 years (today): 76% - stable    Right CI:  3 months: 68%  6 months: 84%  1 year: 68%   2 years: 76%   3 years: 56%  4 years: 80% - improved  5 years (today): 76% - stable    Bilateral CIs:  1 year: Did not test (DNT) due to high scores in unilateral conditions and patient's time limitations.  2 years: DNT due to high scores in unilateral conditions  3 years: DNT due to need for programming   4 years: 84%  5 years (today): 80% - stable     AzBio Sentences Test:  The patient repeats 20 sentences, auditory only.  The sentences are presented at 60 dB A (conversational level) delivered from a CD player.     Preoperative Performance (Feb 2015 before 1st CI:  Left ear aided: 0%  Right ear aided: 97% quiet, 91% +10 dB signal to noise ratio (SNR)  Bilaterally aided: Did not test in quiet, 94% +10 dB  SNR      Preoperative Performance (June 2016 before 2nd CI):  Right ear aided: 40%    Left CI:  3 months: 97% quiet, 76% with +10 dB signal to noise ratio (SNR)  6 months: 94% quiet, 86% with +10 dB SNR  9 months: 96% quiet, 81% with +10 dB SNR  1 years: 97% quiet, 76% with +10 dB SNR  2 years: 94% quiet, 61% with +10 dB SNR  3 years: 91% quiet, 61% with +10 dB SNR   4 years: 90% quiet, Did not test in noise due to need for programming   5 years: 84% quiet - slightly decreased, Did not test in noise due to need for programming   6 years (today): 89% quiet - stable     Right CI:  3 months: 95% in quiet, 53% with +10 dB SNR  6 months: 94% in quiet, 74% with +10 dB SNR  1 year: 98% quiet, 67% with +10 dB SNR  2 years: 96% quiet, 77% with +10 dB SNR  3 years: 94% quiet, Did not test in noise due to need for programming  4 years: 95% quiet - stable, Did not test in noise due to need for programming  5 years (today): 91% quiet - stable    Bilateral CIs:  1 year: DNT due to time limitations  2 years: 75% with +10 dB SNR, 38% with +5 dB SNR  3 years: 93% quiet, 72% with +10 dB SNR, Did not test +5 dB SNR due to need for programming   4 years: DNT quiet, 75% with +10 dB SNR - stable, Did not test +5 dB SNR due to need for programming   5 years (today): DNT quiet, 68% with +10 dB SNR - stable, Did not test +5 dB SNR due to need for programming    After testing, programming changes were made based on patient report that he has been decreasing volume and his wife feels his understanding has decreased.      FITTING SESSION: Dr. Gail Ramirez ordered today's appointment. The patient came to the clinic for adjustment to the programs in the external speech processor and for assessment of the external components of the cochlear implant system. These components provide power and data to the internal device. Sound is only heard once the external portion is activated. Postoperative treatment, including device fitting and adjustment,  audiologic assessments, and training are required at regular intervals. Testing can include electropsychophysical measures of threshold, comfort, and loudness balancing, which are completed to update the program.    Processor type: N6 bilaterally   Headpiece type: 900 Series  Magnet strength: #1 bilaterally - no irritation or discomfort     TEST RESULTS:   Unaided hearing: Did not test  Tympanograms: See above - patient declined testing  Electrode Impedances: within normal limits and stable bilaterally  Neural Response Testing: Clear responses were found across the arrays at previous visits so did not repeat testing today.    Facial Stimulation: Absent  Tinnitus: Present  Balance Problems: Absent  Pain/Discomfort: Absent  Strategies Tried:  Hz    Programs in N6 processors bilaterally:  1. (Left 66, Right 65) Home: ADRO + Autosensitivity  2. (Left 32, Right 47) Stimulation levels at 0 so patient can mute processor to practice with right side alone (at patient request)  3. (Left 66, Right 65) SCAN  4. (Left 66, Right 65) Cafe: Fixed directional, ADRO + Autosensitivity    Number of Channels per Program: 22    COMMENTS: Comfort (C) levels needed reshaping and generally they increased.  Upon going live, volume was too loud.  For the left CI, C levels were globally decreased 10 units.  For the right CI, C levels were globally reduced 10 units and threshold (T) levels were globally reduced 5 units (to widen dynamic range).  Patient reported improved comfort after the changes.  Backup processors were updated.     SUMMARY AND RECOMMENDATIONS: Mr. Swan has been using his right [employer]  cochlear implant for 5 years and his left [employer]  for 6 years.  Speech perception scores are above average.  Audibility and CNC scores are stable.  AzBio scores are essentially stable with a slight decrease in the bilateral noise condition. Programming changes were made bilaterally.  Patient will  return for repeat testing in 1 year.  He will return sooner as needed for programming.  He wishes to wait to upgrade equipment until the next generation processor releases.      Tympanograms were not completed today at patient request due to cost.  Patient will continue to follow up in ENT Clinic regarding his Meniere's and for any other concerns related to his ears or cochlear implants.  The patient expressed understanding and agreement with this plan.      Esteban Fernandez, CCC-A, [facility]  Licensed Audiologist  [place] #[id number]      Enclosure: audiogram

## 2021-12-22 ENCOUNTER — OFFICE VISIT (OUTPATIENT)
Dept: AUDIOLOGY | Facility: CLINIC | Age: 66
End: 2021-12-22
Payer: COMMERCIAL

## 2021-12-22 DIAGNOSIS — H90.3 SENSORINEURAL HEARING LOSS, BILATERAL: Primary | ICD-10-CM

## 2021-12-22 PROCEDURE — 92626 EVAL AUD FUNCJ 1ST HOUR: CPT | Mod: 59 | Performed by: AUDIOLOGIST-HEARING AID FITTER

## 2021-12-22 PROCEDURE — 92604 REPROGRAM COCHLEAR IMPLT 7/>: CPT | Performed by: AUDIOLOGIST-HEARING AID FITTER

## 2022-05-01 ENCOUNTER — HEALTH MAINTENANCE LETTER (OUTPATIENT)
Age: 67
End: 2022-05-01

## 2022-11-20 ENCOUNTER — TELEPHONE (OUTPATIENT)
Dept: AUDIOLOGY | Facility: CLINIC | Age: 67
End: 2022-11-20

## 2022-11-20 NOTE — TELEPHONE ENCOUNTER
Letter of medical necessity for bilateral N8 sound processor upgrades was written and routed to Dr. Gail Ramirez for signature.  Once signed, it will electronically route to [employer], who will assist with insurance authorization.      Esteban Fernandez, CCC-A, [facility]  Licensed Audiologist  [place] #[id number]

## 2022-11-21 ENCOUNTER — HEALTH MAINTENANCE LETTER (OUTPATIENT)
Age: 67
End: 2022-11-21

## 2022-11-28 DIAGNOSIS — H90.3 SENSORINEURAL HEARING LOSS, BILATERAL: Primary | ICD-10-CM

## 2022-12-12 ENCOUNTER — TELEPHONE (OUTPATIENT)
Dept: AUDIOLOGY | Facility: CLINIC | Age: 67
End: 2022-12-12

## 2022-12-12 NOTE — TELEPHONE ENCOUNTER
Service request [id number] was completed for right N8 processor.      Esteban Fernandez, CCC-A, [facility] [place]  Licensed Audiologist  [place] #[id number]

## 2022-12-18 NOTE — PROGRESS NOTES
AUDIOLOGY REPORT    BACKGROUND: Dr. Demarcus Tavarez implanted J Carlos Swan with a left  Nucleus  cochlear implant (CI) serial #[id number] on 5/28/15 (activation 6/17/15) and a right Nucleus  cochlear implant serial #[id number] on 8/15/16 (activation 9/13/16) due to a left profound sensorineural hearing loss and a right severe sensorineural hearing loss.  Patient has Meniere's Disease.      Patient is being seen in Audiology at the [facility] on 12/21/2022 for 6 year testing with the right CI, 7 year testing with the left CI, and reprogramming of his bilateral cochlear implants.      Dr. Gail Ramirez ordered today's visit.        PATIENT REPORT: Mr. Swan recently received his N8 processors which were programmed at [employer] prior to shipping.  He was successfully able to pair the processors to his phone, to the Nucleus Smart agustina, and to his TV accessory.  We discussed that he can also pair the processors to the old mini norman 2+.  Patient has been enjoying using the new processors and feels music quality is improved.  Generally, he has been wearing the SCAN program.  He has increased the volume on the right to 7 and decreased the volume on the left to 4.  He would like adjustments to make these more balanced and to provide access to the ForwardFocus feature.     He lost an earhook on one of his backup N6 processors and this was replaced while in clinic.       FITTING SESSION: Dr. Gail Ramirez ordered today's appointment. The patient came to the clinic for adjustment to the programs in the external speech processor and for assessment of the external components of the cochlear implant system. These components provide power and data to the internal device. Sound is only heard once the external portion is activated. Postoperative treatment, including device fitting and adjustment, audiologic assessments, and training are required at regular intervals. Testing can  include electropsychophysical measures of threshold, comfort, and loudness balancing, which are completed to update the program.    Processor type: N8 bilaterally with N6 backups    Magnet strength: #1 bilaterally - no irritation or discomfort     TEST RESULTS:   Unaided hearing: Did not test  Tympanograms: See above - patient declined testing  Electrode Impedances: within normal limits and stable bilaterally  Neural Response Testing: Clear responses were found across the arrays at previous visits so did not repeat testing today.    Facial Stimulation: Absent  Tinnitus: Present  Balance Problems: Absent  Pain/Discomfort: Absent  Strategies Tried:  Hz    Programs in N8 processors bilaterally:   1. (Left 72, Right 71) Home: ADRO + Autosensitivity  2. (Left 68, Right 70) Stimulation levels at 0 so patient can mute processor when desired   3. (Left 72, Right 71) SCAN  4. (Left 72, Right 71) Cafe: Fixed directional, ADRO + Autosensitivity    Programs in N6 processors bilaterally: Not changed today  1. (Left 66, Right 65) Home: ADRO + Autosensitivity  2. (Left 32, Right 47) Stimulation levels at 0 so patient can mute processor to practice with right side alone (at patient request)  3. (Left 66, Right 65) SCAN  4. (Left 66, Right 65) Cafe: Fixed directional, ADRO + Autosensitivity    Number of Channels per Program: 22    COMMENTS: In N8 processors, the volume of the right was softer than left.  Comfort levels were increased 2 units on the right and decreased 2 units on the left.  Patient reported balance after these changes.  The ForwardFocus feature was activated.     No changes were made to the N6 processors, as the patient feels they sound balanced.     METHOD: Speech perception testing is conducted at regular intervals to determine the degree of benefit the patient is obtaining from the cochlear implant. Tests are conducted using the cochlear implant without the benefit of lipreading. All tests are conducted in  a sound-treated room. Perception of monosyllabic words and words in sentences are tested. Results usually show improvement over time. A decrease in performance indicates the need for re-programming of the prosthesis and/or replacement of components.     INTERVAL: 6 years with right CI; 7 years with left CI.      TEST RESULTS:    Otoscopy: Small amount of non-occluding cerumen in both ears       Unaided Thresholds: Did not test - Left no response in past; Right did not test due to CI and patient request   Tympanograms: Patient declined testing due to cost.  He denies otologic symptoms.    45 minutes were spent assessing the patient s auditory rehabilitation status.    Device(s) used for Testing:   Right ear: N8 processor with #1 magnet (no irritation), SCAN with volume 6  Left ear: N8 processor with #1 magnet (no irritation), SCAN with volume 6      Soundfield Aided Thresholds:   Left CI - Detection in normal to mild loss range - stable  Right CI - Detection in normal to mild loss range - stable      CNC Words Test:  The patient repeats 25 single syllable words, auditory only. The words are presented at 60 dB A (conversational level) delivered from a CD player.    Preoperative Performance (Feb 2015 before 1st CI):  Left ear aided: 0%  Right ear aided: 76%  Bilaterally aided: 68%    Preoperative Performance (June 2016 before 2nd CI):  Right ear aided: 12%    Left CI:  3 months: 88%   6 months: 76%   9 months: 84%   1 years: 84%  2 years: 88%  3 years: 84%  4 years: 56%  5 years: 72% - improved but slightly below baseline  6 years: 76% - stable  7 years (today): 72% - stable    Right CI:  3 months: 68%  6 months: 84%  1 year: 68%   2 years: 76%   3 years: 56%  4 years: 80% - improved  5 years: 76% - stable   6 years (today): 72% - stable     Bilateral CIs:  1 year: Did not test (DNT) due to high scores in unilateral conditions and patient's time limitations.  2 years: DNT due to high scores in unilateral conditions  3  years: DNT due to need for programming   4 years: 84%  5 years: 80% - stable   6 years (today): 84% - stable    AzBio Sentences Test:  The patient repeats 20 sentences, auditory only.  The sentences are presented at 60 dB A (conversational level) delivered from a CD player.     Preoperative Performance (Feb 2015 before 1st CI:  Left ear aided: 0%  Right ear aided: 97% quiet, 91% +10 dB signal to noise ratio (SNR)  Bilaterally aided: Did not test in quiet, 94% +10 dB SNR      Preoperative Performance (June 2016 before 2nd CI):  Right ear aided: 40%    Left CI:  3 months: 97% quiet, 76% with +10 dB signal to noise ratio (SNR)  6 months: 94% quiet, 86% with +10 dB SNR  9 months: 96% quiet, 81% with +10 dB SNR  1 years: 97% quiet, 76% with +10 dB SNR  2 years: 94% quiet, 61% with +10 dB SNR  3 years: 91% quiet, 61% with +10 dB SNR   4 years: 90% quiet, Did not test in noise due to need for programming   5 years: 84% quiet - slightly decreased, Did not test in noise due to need for programming   6 years: 89% quiet - stable   7 years (today): 88% quiet - stable     Right CI:  3 months: 95% in quiet, 53% with +10 dB SNR  6 months: 94% in quiet, 74% with +10 dB SNR  1 year: 98% quiet, 67% with +10 dB SNR  2 years: 96% quiet, 77% with +10 dB SNR  3 years: 94% quiet, Did not test in noise due to need for programming  4 years: 95% quiet - stable, Did not test in noise due to need for programming  5 years: 91% quiet - stable  6 years (today): 93% quiet - stable    Bilateral CIs:  1 year: DNT due to time limitations  2 years: 75% with +10 dB SNR, 38% with +5 dB SNR  3 years: 93% quiet, 72% with +10 dB SNR, Did not test +5 dB SNR due to need for programming   4 years: DNT quiet, 75% with +10 dB SNR - stable, Did not test +5 dB SNR due to need for programming   5 years: DNT quiet, 68% with +10 dB SNR - stable, Did not test +5 dB SNR due to need for programming  6 years (today): DNT quiet, 69% with +10 dB SNR - stable       SUMMARY  AND RECOMMENDATIONS: Mr. Swan has been using his right [employer]  cochlear implant for 6 years and his left [employer]  for 7 years.  He recently received his N8 processors, which were programmed at [employer] prior to shipment.  Programming adjustments were made today to balance the volume and to provide access to ForwardFocus.  Testing was completed after programming.  Audibility and speech perception scores are stable.  Patient will return for repeat testing in 1 year.  He will return sooner as needed for programming.      Tympanograms were not completed today at patient request due to cost.  Patient will continue to follow up in ENT Clinic regarding his Meniere's and for any other concerns related to his ears or cochlear implants.  The patient expressed understanding and agreement with this plan.    Esteban Fernandez, CCC-A, [facility]  Licensed Audiologist  [place] #[id number]    Enclosure: audiogram

## 2022-12-21 ENCOUNTER — OFFICE VISIT (OUTPATIENT)
Dept: AUDIOLOGY | Facility: CLINIC | Age: 67
End: 2022-12-21
Payer: COMMERCIAL

## 2022-12-21 DIAGNOSIS — H90.3 SENSORINEURAL HEARING LOSS, BILATERAL: Primary | ICD-10-CM

## 2022-12-21 PROCEDURE — 92626 EVAL AUD FUNCJ 1ST HOUR: CPT | Performed by: AUDIOLOGIST-HEARING AID FITTER

## 2023-06-02 ENCOUNTER — HEALTH MAINTENANCE LETTER (OUTPATIENT)
Age: 68
End: 2023-06-02

## 2023-10-26 DIAGNOSIS — Z96.21 COCHLEAR IMPLANT IN PLACE: ICD-10-CM

## 2023-10-26 DIAGNOSIS — H90.3 SENSORINEURAL HEARING LOSS, BILATERAL: Primary | ICD-10-CM

## 2023-12-07 NOTE — PROGRESS NOTES
AUDIOLOGY REPORT    BACKGROUND: Dr. Demarcus Tavarez implanted J Carlos Swan with a left  Nucleus  cochlear implant (CI) serial #[id number] on 5/28/15 (activation 6/17/15) and a right Nucleus  cochlear implant serial #[id number] on 8/15/16 (activation 9/13/16) due to a left profound sensorineural hearing loss and a right severe sensorineural hearing loss.  Patient has Meniere's Disease.      Patient is being seen in Audiology at the [facility] on 12/20/2023 for 7.5 year testing with the right CI and 8.5 year testing with the left CI.   Dr. Gail Ramirez ordered today's visit.        PATIENT REPORT:  He has had no major hearing or equipment concerns since last year's visit.  J Carlos has been enjoying music more than in the past.  He feels the N8 processors retain better than his old N6 processors. Patient reports he has been turning his volume control to level 3 when wearing both processors together.  When wearing them individually, he prefers volume at 6.       METHOD: Speech perception testing is conducted at regular intervals to determine the degree of benefit the patient is obtaining from the cochlear implant. Tests are conducted using the cochlear implant without the benefit of lipreading. All tests are conducted in a sound-treated room. Perception of monosyllabic words and words in sentences are tested. Results usually show improvement over time. A decrease in performance indicates the need for re-programming of the prosthesis and/or replacement of components.     INTERVAL: 7.5 years with right CI; 8.5 years with left CI.      TEST RESULTS:    Otoscopy:   RIGHT: Fully occluding cerumen - Patient reports he has softening drops at home he will try and if unsuccessful, he can schedule an ear cleaning.    LEFT: Small amount of cerumen      Unaided Thresholds: Did not test - Left no response in past; Right did not test due to CI and patient request   Tympanograms:  Delivery





- Delivery


Vaginal Delivery: Spontaneous


Type of Anesthesia: Local


Episiotomy/Laceration: 1st degree


EBL (cc): 250





Delivery, Single Birth





- Stages of Labor


Placenta: Yes: Spontaneous





- Condition of Infant


Pediatrician/Neonatologist Present: No


Infant Gender: Male


Position: Right, OA





- Apgar


  ** 1 Minute


Apgar Total Score: 7





  ** 5 Minutes


Apgar Total Score: 9





-  Feeding Plan


Initial Plan: Elected not to breastfeed exclusively throughout hospitalization





Remarks





- Remarks


Remarks: 





 of VMI from DREW position over intact perineum. 39 weeks. No anesthesia. 

Spontaneous delivery of anterior shoulder and body. Nuchal x 1, delivered 

through and then reduced. Meconium noted just prior to delivery of head. Infant 

placed on maternal abdomen; cord cut and clamped by provider. Infant handed off 

immediately to nursing staff. Weight pending. Apgars 7/9. Spontaneous delivery 

of intact placenta with 3VC. Fundus firm. First degree laceration repaired after

10cc !5 lidocaine local injected into area- repaired with 2-0 chromic. 

Hemostasis noted. Mother and baby doing well. EBL 250ml. 





Maria De Jesus Mota MD Patient declined testing due to cost.  He denies otologic symptoms.    45 minutes were spent assessing the patient s auditory rehabilitation status.    Device(s) used for Testing:   Right ear: N8 processor with #1 magnet (no irritation), SCAN with volume 6  Left ear: N8 processor with #1 magnet (no irritation), SCAN with volume 6    Soundfield Aided Thresholds:   Left CI - Detection in normal to mild loss range - stable  Right CI - Detection in normal to mild loss range - stable    CNC Words Test:  The patient repeats 25 single syllable words, auditory only. The words are presented at 60 dB A (conversational level) delivered from a CD player.    Preoperative Performance (Feb 2015 before 1st CI):  Left ear aided: 0%  Right ear aided: 76%  Bilaterally aided: 68%    Preoperative Performance (June 2016 before 2nd CI):  Right ear aided: 12%    Left CI:  3 months: 88%   6 months: 76%   9 months: 84%   1 years: 84%  2 years: 88%  3 years: 84%  4 years: 56%  5 years: 72% - improved but slightly below baseline  6 years: 76% - stable  7 years: 72% - stable  8.5 years (today): 72% - stable    Right CI:  3 months: 68%  6 months: 84%  1 year: 68%   2 years: 76%   3 years: 56%  4 years: 80% - improved  5 years: 76% - stable   6 years: 72% - stable   7.5 years (today): 72% - stable     Bilateral CIs:  1 year: Did not test (DNT) due to high scores in unilateral conditions and patient's time limitations.  2 years: DNT due to high scores in unilateral conditions  3 years: DNT due to need for programming   4 years: 84%  5 years: 80% - stable   6 years: 84% - stable  7.5 years (today): 72% - fairly stable    [employer] Sentences Test:  The patient repeats 20 sentences, auditory only.  The sentences are presented at 60 dB A (conversational level) delivered from a CD player.     Preoperative Performance (Feb 2015 before 1st CI:  Left ear aided: 0%  Right ear aided: 97% quiet, 91% +10 dB signal to noise ratio (SNR)  Bilaterally aided: Did not  test in quiet, 94% +10 dB SNR      Preoperative Performance (2016 before 2nd CI):  Right ear aided: 40%    Left CI:  3 months: 97% quiet, 76% with +10 dB signal to noise ratio (SNR)  6 months: 94% quiet, 86% with +10 dB SNR  9 months: 96% quiet, 81% with +10 dB SNR  1 years: 97% quiet, 76% with +10 dB SNR  2 years: 94% quiet, 61% with +10 dB SNR  3 years: 91% quiet, 61% with +10 dB SNR   4 years: 90% quiet, Did not test in noise due to need for programming   5 years: 84% quiet - slightly decreased, Did not test in noise due to need for programming   6 years: 89% quiet - stable   7 years: 88% quiet - stable   8.5 years (today): 95% quiet - stable     Right CI:  3 months: 95% in quiet, 53% with +10 dB SNR  6 months: 94% in quiet, 74% with +10 dB SNR  1 year: 98% quiet, 67% with +10 dB SNR  2 years: 96% quiet, 77% with +10 dB SNR  3 years: 94% quiet, Did not test in noise due to need for programming  4 years: 95% quiet - stable, Did not test in noise due to need for programming  5 years: 91% quiet - stable  6 years: 93% quiet - stable  7.5 years (today): 94% quiet - stable    Bilateral CIs:  1 year: DNT due to time limitations  2 years: 75% with +10 dB SNR, 38% with +5 dB SNR  3 years: 93% quiet, 72% with +10 dB SNR, Did not test +5 dB SNR due to need for programming   4 years: DNT quiet, 75% with +10 dB SNR - stable, Did not test +5 dB SNR due to need for programming   5 years: DNT quiet, 68% with +10 dB SNR - stable, Did not test +5 dB SNR due to need for programming  6 years: DNT quiet, 69% with +10 dB SNR - stable  7.5 years (today): DNT quiet, 66% with +10 dB SNR - stable    COMMENTS:   Dataloggin.5 hours of use per day bilaterally  Impedances: Right stable and normal; Left normal, electrodes 4-3 increased slightly but compliance was maintained so no programming changes were needed.    Since impedances were checked but no programming was needed, programming charges were modified.     SUMMARY AND  RECOMMENDATIONS: Mr. Swan has been using his right [employer]  cochlear implant for 7.5 years and his left [employer]chlear [employer]  for 8.5 years.  Audibility and speech perception scores are stable.  Patient will return for repeat testing in 1 year.  He will return sooner as needed for programming.      Tympanograms were not completed today at patient request due to cost.  Patient will continue to follow up in ENT Clinic regarding his Meniere's and for any other concerns related to his ears or cochlear implants.  The patient expressed understanding and agreement with this plan.    KIT Bermudez.   Audiology Doctoral Student   [place] #[id number]      I was present with the patient for the entire Audiology appointment including all procedures/testing performed by the AuD student, and agree with the assessment and plan as documented.    Esteban Fernandez, CCC-A, [facility]  Licensed Audiologist  [place] #[id number]      Enclosure: audiogram

## 2023-12-20 ENCOUNTER — OFFICE VISIT (OUTPATIENT)
Dept: AUDIOLOGY | Facility: CLINIC | Age: 68
End: 2023-12-20
Payer: COMMERCIAL

## 2023-12-20 DIAGNOSIS — H90.3 SENSORINEURAL HEARING LOSS, BILATERAL: Primary | ICD-10-CM

## 2023-12-20 DIAGNOSIS — Z96.21 COCHLEAR IMPLANT IN PLACE: ICD-10-CM

## 2023-12-20 PROCEDURE — 92626 EVAL AUD FUNCJ 1ST HOUR: CPT | Mod: 59 | Performed by: AUDIOLOGIST-HEARING AID FITTER

## 2023-12-20 PROCEDURE — 92604 REPROGRAM COCHLEAR IMPLT 7/>: CPT | Mod: 52 | Performed by: AUDIOLOGIST-HEARING AID FITTER

## 2024-06-22 ENCOUNTER — HEALTH MAINTENANCE LETTER (OUTPATIENT)
Age: 69
End: 2024-06-22

## 2024-10-28 DIAGNOSIS — Z96.21 COCHLEAR IMPLANT IN PLACE: Primary | ICD-10-CM

## 2024-12-03 NOTE — PROGRESS NOTES
AUDIOLOGY REPORT    BACKGROUND: Dr. Demarcus Tavarez implanted J Carlos Swan with a left  Nucleus  cochlear implant (CI) serial #[id number] on 5/28/15 (activation 6/17/15) and a right Nucleus  cochlear implant serial #[id number] on 8/15/16 (activation 9/13/16) due to a left profound sensorineural hearing loss and a right severe sensorineural hearing loss.  Patient has Meniere's Disease.      Patient is being seen in Audiology at the [facility] on 12/18/2024 for 8.5 year testing with the right CI and 9.5 year testing with the left CI.     Dr. Gail Ramirez ordered today's visit.        PATIENT REPORT:  He has had no major hearing or equipment concerns since last year's visit.      METHOD: Speech perception testing is conducted at regular intervals to determine the degree of benefit the patient is obtaining from the cochlear implant. Tests are conducted using the cochlear implant without the benefit of lipreading. All tests are conducted in a sound-treated room. Perception of monosyllabic words and words in sentences are tested. Results usually show improvement over time. A decrease in performance indicates the need for re-programming of the prosthesis and/or replacement of components.     INTERVAL: 8.5 years with right CI; 9.5 years with left CI.      TEST RESULTS:    Otoscopy: Both ears are clear of cerumen.     Unaided Thresholds: Did not test - Left no response in past; Right did not test due to CI and patient request   Tympanograms: Patient declined testing due to cost.  He denies otologic symptoms.    45 minutes were spent assessing the patient s auditory rehabilitation status.    Device(s) used for Testing:   Right ear: N8 processor with #1 magnet (no irritation), SCAN with volume 6  Left ear: N8 processor with #1 magnet (no irritation), SCAN with volume 6    Soundfield Aided Thresholds:   Left CI - Detection in normal to mild loss range - stable  Right CI  - Detection in normal to mild loss range - improved 10 dB at 3000 Hz    CNC Words Test:  The patient repeats 25 single syllable words, auditory only. The words are presented at 60 dB A (conversational level) delivered from a CD player.    Preoperative Performance (Feb 2015 before 1st CI):  Left ear aided: 0%  Right ear aided: 76%  Bilaterally aided: 68%    Preoperative Performance (June 2016 before 2nd CI):  Right ear aided: 12%    Left CI:  3 months: 88%   6 months: 76%   9 months: 84%   1 years: 84%  2 years: 88%  3 years: 84%  4 years: 56%  5 years: 72% - improved but slightly below baseline  6 years: 76% - stable  7 years: 72% - stable  8.5 years: 72% - stable  9.5 years (today): 76% - stable    Right CI:  3 months: 68%  6 months: 84%  1 year: 68%   2 years: 76%   3 years: 56%  4 years: 80% - improved  5 years: 76% - stable   6 years: 72% - stable   7.5 years: 72% - stable   8.5 years (today): 72% - stable     Bilateral CIs:  1 year: Did not test (DNT) due to high scores in unilateral conditions and patient's time limitations.  2 years: DNT due to high scores in unilateral conditions  3 years: DNT due to need for programming   4 years: 84%  5 years: 80% - stable   6 years: 84% - stable  7.5 years: 72% - fairly stable  8.5 years (today): 84% - stable    [employer] Sentences Test:  The patient repeats 20 sentences, auditory only.  The sentences are presented at 60 dB A (conversational level) delivered from a CD player.     Preoperative Performance (Feb 2015 before 1st CI:  Left ear aided: 0%  Right ear aided: 97% quiet, 91% +10 dB signal to noise ratio (SNR)  Bilaterally aided: Did not test in quiet, 94% +10 dB SNR      Preoperative Performance (June 2016 before 2nd CI):  Right ear aided: 40%    Left CI:  3 months: 97% quiet, 76% with +10 dB signal to noise ratio (SNR)  6 months: 94% quiet, 86% with +10 dB SNR  9 months: 96% quiet, 81% with +10 dB SNR  1 years: 97% quiet, 76% with +10 dB SNR  2 years: 94% quiet, 61%  with +10 dB SNR  3 years: 91% quiet, 61% with +10 dB SNR   4 years: 90% quiet, Did not test in noise due to need for programming   5 years: 84% quiet - slightly decreased, Did not test in noise due to need for programming   6 years: 89% quiet - stable   7 years: 88% quiet - stable   8.5 years: 95% quiet - stable   9.5 years (today): 90% quiet - stable     Right CI:  3 months: 95% in quiet, 53% with +10 dB SNR  6 months: 94% in quiet, 74% with +10 dB SNR  1 year: 98% quiet, 67% with +10 dB SNR  2 years: 96% quiet, 77% with +10 dB SNR  3 years: 94% quiet, Did not test in noise due to need for programming  4 years: 95% quiet - stable, Did not test in noise due to need for programming  5 years: 91% quiet - stable  6 years: 93% quiet - stable  7.5 years: 94% quiet - stable  8.5 years (today): 93% quiet - stable    Bilateral CIs:  1 year: DNT due to time limitations  2 years: 75% with +10 dB SNR, 38% with +5 dB SNR  3 years: 93% quiet, 72% with +10 dB SNR, Did not test +5 dB SNR due to need for programming   4 years: DNT quiet, 75% with +10 dB SNR - stable, Did not test +5 dB SNR due to need for programming   5 years: DNT quiet, 68% with +10 dB SNR - stable, Did not test +5 dB SNR due to need for programming  6 years: DNT quiet, 69% with +10 dB SNR - stable  7.5 years: DNT quiet, 66% with +10 dB SNR - stable  8.5 years (today): DNT quiet, 64% with +10 dB SNR - stable    COMMENTS:   Dataloggin.5 hours of use per day bilaterally.   Impedances: Stable and normal bilaterally.   No programming adjustments were advised based on lara testing and patient report.    Since impedances were checked but no programming was needed, programming charges were modified.     SUMMARY AND RECOMMENDATIONS: Mr. Swan has been using his right [employer]  cochlear implant for 8.5 years and his left [employer]chlear [employer]  for 9.5 years.  Audibility and speech perception scores are essentially stable.  Electrode impedances are  stable.  Patient will return for repeat testing in 1 year.  He will return sooner as needed for programming.  If scores are stable next year, he will consider if he'd like to start coming every 2 years or monitoring rather than annually.     Tympanograms were not completed today at past patient request due to cost.  Patient will continue to follow up in ENT Clinic regarding his Meniere's and for any other concerns related to his ears or cochlear implants as needed.  He denies any otologic symptoms at this time.       The patient expressed understanding and agreement with this plan.    Esteban Fernandez, CCC-A, [facility]  Licensed Audiologist  [place] #[id number]

## 2024-12-18 ENCOUNTER — OFFICE VISIT (OUTPATIENT)
Dept: AUDIOLOGY | Facility: CLINIC | Age: 69
End: 2024-12-18
Payer: COMMERCIAL

## 2024-12-18 DIAGNOSIS — H90.3 SENSORINEURAL HEARING LOSS, BILATERAL: Primary | ICD-10-CM

## 2025-03-29 ENCOUNTER — HEALTH MAINTENANCE LETTER (OUTPATIENT)
Age: 70
End: 2025-03-29

## 2025-07-12 ENCOUNTER — HEALTH MAINTENANCE LETTER (OUTPATIENT)
Age: 70
End: 2025-07-12